# Patient Record
Sex: MALE | Race: WHITE | URBAN - METROPOLITAN AREA
[De-identification: names, ages, dates, MRNs, and addresses within clinical notes are randomized per-mention and may not be internally consistent; named-entity substitution may affect disease eponyms.]

---

## 2018-10-11 ENCOUNTER — APPOINTMENT (RX ONLY)
Dept: URBAN - METROPOLITAN AREA CLINIC 310 | Facility: CLINIC | Age: 53
Setting detail: DERMATOLOGY
End: 2018-10-11

## 2018-10-11 DIAGNOSIS — L57.0 ACTINIC KERATOSIS: ICD-10-CM

## 2018-10-11 PROBLEM — L29.8 OTHER PRURITUS: Status: ACTIVE | Noted: 2018-10-11

## 2018-10-11 PROBLEM — L20.84 INTRINSIC (ALLERGIC) ECZEMA: Status: ACTIVE | Noted: 2018-10-11

## 2018-10-11 PROBLEM — L85.3 XEROSIS CUTIS: Status: ACTIVE | Noted: 2018-10-11

## 2018-10-11 PROCEDURE — ? LIQUID NITROGEN

## 2018-10-11 PROCEDURE — 17000 DESTRUCT PREMALG LESION: CPT

## 2018-10-11 ASSESSMENT — LOCATION SIMPLE DESCRIPTION DERM: LOCATION SIMPLE: ANTERIOR SCALP

## 2018-10-11 ASSESSMENT — LOCATION DETAILED DESCRIPTION DERM: LOCATION DETAILED: MID-FRONTAL SCALP

## 2018-10-11 ASSESSMENT — LOCATION ZONE DERM: LOCATION ZONE: SCALP

## 2018-10-11 NOTE — PROCEDURE: LIQUID NITROGEN
Detail Level: Detailed
Duration Of Freeze Thaw-Cycle (Seconds): 10
Consent: The patient's consent was obtained verbally.
Post-Care Instructions: I
Render Post-Care Instructions In Note?: no
Number Of Freeze-Thaw Cycles: 1 freeze-thaw cycle

## 2019-10-18 ENCOUNTER — APPOINTMENT (RX ONLY)
Dept: URBAN - METROPOLITAN AREA CLINIC 310 | Facility: CLINIC | Age: 54
Setting detail: DERMATOLOGY
End: 2019-10-18

## 2019-10-18 DIAGNOSIS — L57.0 ACTINIC KERATOSIS: ICD-10-CM

## 2019-10-18 PROCEDURE — 17000 DESTRUCT PREMALG LESION: CPT

## 2019-10-18 PROCEDURE — ? LIQUID NITROGEN

## 2019-10-18 PROCEDURE — ? FULL BODY SKIN EXAM

## 2019-10-18 ASSESSMENT — LOCATION DETAILED DESCRIPTION DERM: LOCATION DETAILED: RIGHT CENTRAL FRONTAL SCALP

## 2019-10-18 ASSESSMENT — LOCATION ZONE DERM: LOCATION ZONE: SCALP

## 2019-10-18 ASSESSMENT — LOCATION SIMPLE DESCRIPTION DERM: LOCATION SIMPLE: RIGHT SCALP

## 2019-10-18 NOTE — PROCEDURE: LIQUID NITROGEN
Detail Level: Detailed
Render Post-Care Instructions In Note?: no
Duration Of Freeze Thaw-Cycle (Seconds): 10
Number Of Freeze-Thaw Cycles: 1 freeze-thaw cycle
Consent: The patient's consent was obtained including but not limited to risks of crusting, scabbing, blistering, scarring, darker or lighter pigmentary change, recurrence, incomplete removal and infection.
Post-Care Instructions: I reviewed with the patient in detail post-care instructions. Patient is to wear sunprotection, and avoid picking at any of the treated lesions. Pt may apply Vaseline to crusted or scabbing areas.